# Patient Record
Sex: FEMALE | Race: OTHER | HISPANIC OR LATINO | Employment: OTHER | URBAN - METROPOLITAN AREA
[De-identification: names, ages, dates, MRNs, and addresses within clinical notes are randomized per-mention and may not be internally consistent; named-entity substitution may affect disease eponyms.]

---

## 2018-12-08 ENCOUNTER — APPOINTMENT (EMERGENCY)
Dept: RADIOLOGY | Facility: HOSPITAL | Age: 48
End: 2018-12-08
Payer: MEDICARE

## 2018-12-08 ENCOUNTER — APPOINTMENT (EMERGENCY)
Dept: CT IMAGING | Facility: HOSPITAL | Age: 48
End: 2018-12-08
Payer: MEDICARE

## 2018-12-08 ENCOUNTER — HOSPITAL ENCOUNTER (EMERGENCY)
Facility: HOSPITAL | Age: 48
Discharge: HOME | End: 2018-12-08
Attending: EMERGENCY MEDICINE | Admitting: EMERGENCY MEDICINE
Payer: MEDICARE

## 2018-12-08 VITALS
TEMPERATURE: 97.6 F | OXYGEN SATURATION: 97 % | SYSTOLIC BLOOD PRESSURE: 142 MMHG | RESPIRATION RATE: 16 BRPM | WEIGHT: 137.13 LBS | DIASTOLIC BLOOD PRESSURE: 84 MMHG | HEART RATE: 108 BPM

## 2018-12-08 DIAGNOSIS — E87.2 ACIDOSIS: ICD-10-CM

## 2018-12-08 DIAGNOSIS — N20.0 KIDNEY STONE ON LEFT SIDE: ICD-10-CM

## 2018-12-08 DIAGNOSIS — K52.9 ENTERITIS: Primary | ICD-10-CM

## 2018-12-08 DIAGNOSIS — D21.9 LEIOMYOMA: ICD-10-CM

## 2018-12-08 DIAGNOSIS — D72.825 BANDEMIA: ICD-10-CM

## 2018-12-08 LAB
ALBUMIN SERPL BCP-MCNC: 3.6 G/DL (ref 3.5–5)
ALP SERPL-CCNC: 96 U/L (ref 46–116)
ALT SERPL W P-5'-P-CCNC: 55 U/L (ref 12–78)
ANION GAP SERPL CALCULATED.3IONS-SCNC: 15 MMOL/L (ref 4–13)
ANION GAP SERPL CALCULATED.3IONS-SCNC: 18 MMOL/L (ref 4–13)
ANISOCYTOSIS BLD QL SMEAR: PRESENT
ANISOCYTOSIS BLD QL SMEAR: PRESENT
AST SERPL W P-5'-P-CCNC: 102 U/L (ref 5–45)
BACTERIA UR QL AUTO: ABNORMAL /HPF
BASOPHILS # BLD MANUAL: 0 THOUSAND/UL (ref 0–0.1)
BASOPHILS # BLD MANUAL: 0 THOUSAND/UL (ref 0–0.1)
BASOPHILS NFR MAR MANUAL: 0 % (ref 0–1)
BASOPHILS NFR MAR MANUAL: 0 % (ref 0–1)
BILIRUB SERPL-MCNC: 0.52 MG/DL (ref 0.2–1)
BILIRUB UR QL STRIP: ABNORMAL
BUN SERPL-MCNC: 14 MG/DL (ref 5–25)
BUN SERPL-MCNC: 9 MG/DL (ref 5–25)
CALCIUM SERPL-MCNC: 8 MG/DL (ref 8.3–10.1)
CALCIUM SERPL-MCNC: 9.7 MG/DL (ref 8.3–10.1)
CHLORIDE SERPL-SCNC: 100 MMOL/L (ref 100–108)
CHLORIDE SERPL-SCNC: 105 MMOL/L (ref 100–108)
CLARITY UR: CLEAR
CO2 SERPL-SCNC: 19 MMOL/L (ref 21–32)
CO2 SERPL-SCNC: 20 MMOL/L (ref 21–32)
COLOR UR: YELLOW
COLOR, POC: YELLOW
CREAT SERPL-MCNC: 0.81 MG/DL (ref 0.6–1.3)
CREAT SERPL-MCNC: 0.84 MG/DL (ref 0.6–1.3)
EOSINOPHIL # BLD MANUAL: 0.13 THOUSAND/UL (ref 0–0.4)
EOSINOPHIL # BLD MANUAL: 0.19 THOUSAND/UL (ref 0–0.4)
EOSINOPHIL NFR BLD MANUAL: 1 % (ref 0–6)
EOSINOPHIL NFR BLD MANUAL: 1 % (ref 0–6)
ERYTHROCYTE [DISTWIDTH] IN BLOOD BY AUTOMATED COUNT: 15.3 % (ref 11.6–15.1)
ERYTHROCYTE [DISTWIDTH] IN BLOOD BY AUTOMATED COUNT: 15.5 % (ref 11.6–15.1)
EXT PREG TEST URINE: NEGATIVE
GFR SERPL CREATININE-BSD FRML MDRD: 84 ML/MIN/1.73SQ M
GFR SERPL CREATININE-BSD FRML MDRD: 88 ML/MIN/1.73SQ M
GLUCOSE SERPL-MCNC: 124 MG/DL (ref 65–140)
GLUCOSE SERPL-MCNC: 146 MG/DL (ref 65–140)
GLUCOSE UR STRIP-MCNC: NEGATIVE MG/DL
HCT VFR BLD AUTO: 34.4 % (ref 34.8–46.1)
HCT VFR BLD AUTO: 41.6 % (ref 34.8–46.1)
HGB BLD-MCNC: 10.6 G/DL (ref 11.5–15.4)
HGB BLD-MCNC: 13.1 G/DL (ref 11.5–15.4)
HGB UR QL STRIP.AUTO: ABNORMAL
KETONES UR STRIP-MCNC: ABNORMAL MG/DL
LEUKOCYTE ESTERASE UR QL STRIP: NEGATIVE
LG PLATELETS BLD QL SMEAR: PRESENT
LIPASE SERPL-CCNC: 257 U/L (ref 73–393)
LYMPHOCYTES # BLD AUTO: 0.39 THOUSAND/UL (ref 0.6–4.47)
LYMPHOCYTES # BLD AUTO: 0.39 THOUSAND/UL (ref 0.6–4.47)
LYMPHOCYTES # BLD AUTO: 2 % (ref 14–44)
LYMPHOCYTES # BLD AUTO: 3 % (ref 14–44)
MCH RBC QN AUTO: 27 PG (ref 26.8–34.3)
MCH RBC QN AUTO: 27.1 PG (ref 26.8–34.3)
MCHC RBC AUTO-ENTMCNC: 30.8 G/DL (ref 31.4–37.4)
MCHC RBC AUTO-ENTMCNC: 31.5 G/DL (ref 31.4–37.4)
MCV RBC AUTO: 86 FL (ref 82–98)
MCV RBC AUTO: 88 FL (ref 82–98)
MONOCYTES # BLD AUTO: 0.19 THOUSAND/UL (ref 0–1.22)
MONOCYTES # BLD AUTO: 0.39 THOUSAND/UL (ref 0–1.22)
MONOCYTES NFR BLD: 1 % (ref 4–12)
MONOCYTES NFR BLD: 3 % (ref 4–12)
NEUTROPHILS # BLD MANUAL: 11.99 THOUSAND/UL (ref 1.85–7.62)
NEUTROPHILS # BLD MANUAL: 18.52 THOUSAND/UL (ref 1.85–7.62)
NEUTS BAND NFR BLD MANUAL: 10 % (ref 0–8)
NEUTS BAND NFR BLD MANUAL: 5 % (ref 0–8)
NEUTS SEG NFR BLD AUTO: 86 % (ref 43–75)
NEUTS SEG NFR BLD AUTO: 88 % (ref 43–75)
NITRITE UR QL STRIP: NEGATIVE
NON-SQ EPI CELLS URNS QL MICRO: ABNORMAL /HPF
NRBC BLD AUTO-RTO: 0 /100 WBCS
NRBC BLD AUTO-RTO: 0 /100 WBCS
PH UR STRIP.AUTO: 6 [PH] (ref 4.5–8)
PLATELET # BLD AUTO: 275 THOUSANDS/UL (ref 149–390)
PLATELET # BLD AUTO: 396 THOUSANDS/UL (ref 149–390)
PLATELET BLD QL SMEAR: ADEQUATE
PLATELET BLD QL SMEAR: ADEQUATE
PMV BLD AUTO: 11.2 FL (ref 8.9–12.7)
PMV BLD AUTO: 11.6 FL (ref 8.9–12.7)
POTASSIUM SERPL-SCNC: 3.8 MMOL/L (ref 3.5–5.3)
POTASSIUM SERPL-SCNC: 3.9 MMOL/L (ref 3.5–5.3)
PROT SERPL-MCNC: 8.6 G/DL (ref 6.4–8.2)
PROT UR STRIP-MCNC: >=300 MG/DL
RBC # BLD AUTO: 3.92 MILLION/UL (ref 3.81–5.12)
RBC # BLD AUTO: 4.83 MILLION/UL (ref 3.81–5.12)
RBC #/AREA URNS AUTO: ABNORMAL /HPF
SODIUM SERPL-SCNC: 137 MMOL/L (ref 136–145)
SODIUM SERPL-SCNC: 140 MMOL/L (ref 136–145)
SP GR UR STRIP.AUTO: >=1.03 (ref 1–1.03)
TOTAL CELLS COUNTED SPEC: 100
TOTAL CELLS COUNTED SPEC: 100
UROBILINOGEN UR QL STRIP.AUTO: 0.2 E.U./DL
WBC # BLD AUTO: 12.89 THOUSAND/UL (ref 4.31–10.16)
WBC # BLD AUTO: 19.29 THOUSAND/UL (ref 4.31–10.16)
WBC #/AREA URNS AUTO: ABNORMAL /HPF

## 2018-12-08 PROCEDURE — 71046 X-RAY EXAM CHEST 2 VIEWS: CPT

## 2018-12-08 PROCEDURE — 83690 ASSAY OF LIPASE: CPT | Performed by: PHYSICIAN ASSISTANT

## 2018-12-08 PROCEDURE — 80053 COMPREHEN METABOLIC PANEL: CPT | Performed by: PHYSICIAN ASSISTANT

## 2018-12-08 PROCEDURE — 96361 HYDRATE IV INFUSION ADD-ON: CPT

## 2018-12-08 PROCEDURE — 85007 BL SMEAR W/DIFF WBC COUNT: CPT | Performed by: PHYSICIAN ASSISTANT

## 2018-12-08 PROCEDURE — 85027 COMPLETE CBC AUTOMATED: CPT | Performed by: PHYSICIAN ASSISTANT

## 2018-12-08 PROCEDURE — 96374 THER/PROPH/DIAG INJ IV PUSH: CPT

## 2018-12-08 PROCEDURE — 99284 EMERGENCY DEPT VISIT MOD MDM: CPT

## 2018-12-08 PROCEDURE — 36415 COLL VENOUS BLD VENIPUNCTURE: CPT | Performed by: PHYSICIAN ASSISTANT

## 2018-12-08 PROCEDURE — 81025 URINE PREGNANCY TEST: CPT | Performed by: PHYSICIAN ASSISTANT

## 2018-12-08 PROCEDURE — 96375 TX/PRO/DX INJ NEW DRUG ADDON: CPT

## 2018-12-08 PROCEDURE — 80048 BASIC METABOLIC PNL TOTAL CA: CPT | Performed by: PHYSICIAN ASSISTANT

## 2018-12-08 PROCEDURE — 74177 CT ABD & PELVIS W/CONTRAST: CPT

## 2018-12-08 PROCEDURE — 81001 URINALYSIS AUTO W/SCOPE: CPT

## 2018-12-08 RX ORDER — LISINOPRIL 5 MG/1
5 TABLET ORAL DAILY
COMMUNITY

## 2018-12-08 RX ORDER — LORAZEPAM 2 MG/ML
1 INJECTION INTRAMUSCULAR ONCE
Status: COMPLETED | OUTPATIENT
Start: 2018-12-08 | End: 2018-12-08

## 2018-12-08 RX ORDER — ONDANSETRON 2 MG/ML
4 INJECTION INTRAMUSCULAR; INTRAVENOUS ONCE
Status: COMPLETED | OUTPATIENT
Start: 2018-12-08 | End: 2018-12-08

## 2018-12-08 RX ORDER — ONDANSETRON 4 MG/1
4 TABLET, ORALLY DISINTEGRATING ORAL EVERY 8 HOURS PRN
Qty: 12 TABLET | Refills: 0 | Status: SHIPPED | OUTPATIENT
Start: 2018-12-08

## 2018-12-08 RX ORDER — DICYCLOMINE HCL 20 MG
20 TABLET ORAL 2 TIMES DAILY
Qty: 20 TABLET | Refills: 0 | Status: SHIPPED | OUTPATIENT
Start: 2018-12-08

## 2018-12-08 RX ADMIN — ONDANSETRON 4 MG: 2 INJECTION INTRAMUSCULAR; INTRAVENOUS at 07:54

## 2018-12-08 RX ADMIN — IOHEXOL 100 ML: 350 INJECTION, SOLUTION INTRAVENOUS at 09:04

## 2018-12-08 RX ADMIN — SODIUM CHLORIDE 1000 ML: 0.9 INJECTION, SOLUTION INTRAVENOUS at 09:47

## 2018-12-08 RX ADMIN — SODIUM CHLORIDE 1000 ML: 0.9 INJECTION, SOLUTION INTRAVENOUS at 10:52

## 2018-12-08 RX ADMIN — LORAZEPAM 1 MG: 2 INJECTION INTRAMUSCULAR; INTRAVENOUS at 07:54

## 2018-12-08 RX ADMIN — SODIUM CHLORIDE 1000 ML: 0.9 INJECTION, SOLUTION INTRAVENOUS at 07:55

## 2018-12-08 NOTE — ED NOTES
Pt states she is feeling much better  Explained to patient we will wait about one half hour more and then redraw labs       Oh Galindo RN  12/08/18 9730

## 2018-12-08 NOTE — ED NOTES
Pt's fluids moved to pole to elevate them and get them in faster       Kalee Hess, MARIE  12/08/18 9382

## 2018-12-08 NOTE — ED PROVIDER NOTES
History  Chief Complaint   Patient presents with    Abdominal Pain     mid abdominal pain with vomiting and diarrhea that started last night  no sick contacts  pt anxious upon arrival      47y  o female with PMH of fibromylagia, HTN, depression and panic attacks presents to the ER for nausea, vomiting, diarrhea and epigastric abdominal pain since last night  Patient denies taking any medication for symptoms  She describes her abdominal pain as "something stuck"  She denies radiation of pain  She rates her pain 6/10 and states it comes and goes  She denies sick contacts  She recently traveled here from [facility] on the [facility]th  She is here visiting and is returning to [place] next week  She denies fever, chills, chest pain, dyspnea, urinary symptoms, weakness or paresthesias  History provided by:  Patient   used: No        Prior to Admission Medications   Prescriptions Last Dose Informant Patient Reported? Taking? LORAZEPAM PO   Yes Yes   Sig: Take by mouth as needed   lisinopril (ZESTRIL) 5 mg tablet   Yes Yes   Sig: Take 5 mg by mouth daily      Facility-Administered Medications: None       Past Medical History:   Diagnosis Date    Fibromyalgia     Hypertension     emotional htn per patient   Psychiatric disorder     depression, panic attacks       Past Surgical History:   Procedure Laterality Date    BREAST SURGERY      CHOLECYSTECTOMY      HYSTERECTOMY      KIDNEY STONE SURGERY         History reviewed  No pertinent family history  I have reviewed and agree with the history as documented  Social History   Substance Use Topics    Smoking status: Never Smoker    Smokeless tobacco: Never Used    Alcohol use No        Review of Systems   Constitutional: Negative for chills and fever  Eyes: Negative for redness  Respiratory: Negative for shortness of breath  Cardiovascular: Negative for chest pain     Gastrointestinal: Positive for abdominal pain, diarrhea, nausea and vomiting  Genitourinary: Negative for dysuria, frequency, hematuria and urgency  Musculoskeletal: Negative for neck stiffness  Skin: Negative for rash  Allergic/Immunologic: Negative for food allergies  Neurological: Negative for weakness and numbness  Physical Exam  Physical Exam   Constitutional:  Non-toxic appearance  No distress  HENT:   Head: Normocephalic and atraumatic  Right Ear: Tympanic membrane, external ear and ear canal normal  No drainage, swelling or tenderness  No foreign bodies  Tympanic membrane is not erythematous  No hemotympanum  Left Ear: Tympanic membrane, external ear and ear canal normal  No drainage, swelling or tenderness  No foreign bodies  Tympanic membrane is not erythematous  No hemotympanum  Nose: Nose normal    Mouth/Throat: Uvula is midline, oropharynx is clear and moist and mucous membranes are normal  No uvula swelling  No posterior oropharyngeal edema, posterior oropharyngeal erythema or tonsillar abscesses  No tonsillar exudate  Neck: Normal range of motion and phonation normal  Neck supple  No tracheal deviation present  Cardiovascular: Normal rate, regular rhythm, S1 normal, S2 normal and normal heart sounds  Exam reveals no gallop and no friction rub  No murmur heard  Pulmonary/Chest: Effort normal and breath sounds normal  No respiratory distress  She has no decreased breath sounds  She has no wheezes  She has no rhonchi  She has no rales  She exhibits no tenderness  Abdominal: Soft  Bowel sounds are normal  She exhibits no distension  There is tenderness in the epigastric area  There is no rebound, no guarding and no CVA tenderness  Neurological: She is alert  GCS eye subscore is 4  GCS verbal subscore is 5  GCS motor subscore is 6  Skin: Skin is warm and dry  No rash noted  Psychiatric: She has a normal mood and affect  Nursing note and vitals reviewed        Vital Signs  ED Triage Vitals [12/08/18 0730]   Temperature Pulse Respirations Blood Pressure SpO2   97 6 °F (36 4 °C) (!) 135 18 (!) 201/112 98 %      Temp Source Heart Rate Source Patient Position - Orthostatic VS BP Location FiO2 (%)   Oral Monitor Sitting Right arm --      Pain Score       7           Vitals:    12/08/18 0800 12/08/18 0913 12/08/18 1051 12/08/18 1203   BP:  128/72 146/90 142/84   Pulse: (!) 110 97 103 (!) 108   Patient Position - Orthostatic VS:   Lying        Visual Acuity      ED Medications  Medications   sodium chloride 0 9 % bolus 1,000 mL (0 mL Intravenous Stopped 12/8/18 0947)   ondansetron (ZOFRAN) injection 4 mg (4 mg Intravenous Given 12/8/18 0754)   LORazepam (ATIVAN) 2 mg/mL injection 1 mg (1 mg Intravenous Given 12/8/18 0754)   iohexol (OMNIPAQUE) 350 MG/ML injection (SINGLE-DOSE) 100 mL (100 mL Intravenous Given 12/8/18 0904)   sodium chloride 0 9 % bolus 1,000 mL (0 mL Intravenous Stopped 12/8/18 1346)   sodium chloride 0 9 % bolus 1,000 mL (0 mL Intravenous Stopped 12/8/18 1047)       Diagnostic Studies  Results Reviewed     Procedure Component Value Units Date/Time    CBC and differential [[id number]]  (Abnormal) Collected:  12/08/18 1233    Lab Status:  Final result Specimen:  Blood from Arm, Right Updated:  12/08/18 1326     WBC 12 89 (H) Thousand/uL      RBC 3 92 Million/uL      Hemoglobin 10 6 (L) g/dL      Hematocrit 34 4 (L) %      MCV 88 fL      MCH 27 0 pg      MCHC 30 8 (L) g/dL      RDW 15 5 (H) %      MPV 11 6 fL      Platelets 331 Thousands/uL      nRBC 0 /100 WBCs     Basic metabolic panel [[id number]]  (Abnormal) Collected:  12/08/18 1233    Lab Status:  Final result Specimen:  Blood from Arm, Right Updated:  12/08/18 1308     Sodium 140 mmol/L      Potassium 3 8 mmol/L      Chloride 105 mmol/L      CO2 20 (L) mmol/L      ANION GAP 15 (H) mmol/L      BUN 9 mg/dL      Creatinine 0 81 mg/dL      Glucose 124 mg/dL      Calcium 8 0 (L) mg/dL      eGFR 88 ml/min/1 73sq m     Narrative:         National Kidney Disease Education Program recommendations are as follows:  GFR calculation is accurate only with a steady state creatinine  Chronic Kidney disease less than 60 ml/min/1 73 sq  meters  Kidney failure less than 15 ml/min/1 73 sq  meters  Urine Microscopic [[id number]]  (Abnormal) Collected:  12/08/18 0821    Lab Status:  Final result Specimen:  Urine from Urine, Clean Catch Updated:  12/08/18 0854     RBC, UA None Seen /hpf      WBC, UA 1-2 (A) /hpf      Epithelial Cells Occasional /hpf      Bacteria, UA None Seen /hpf     CBC and differential [[id number]]  (Abnormal) Collected:  12/08/18 0755    Lab Status:  Final result Specimen:  Blood from Arm, Right Updated:  12/08/18 0838     WBC 19 29 (H) Thousand/uL      RBC 4 83 Million/uL      Hemoglobin 13 1 g/dL      Hematocrit 41 6 %      MCV 86 fL      MCH 27 1 pg      MCHC 31 5 g/dL      RDW 15 3 (H) %      MPV 11 2 fL      Platelets 050 (H) Thousands/uL      nRBC 0 /100 WBCs     Narrative: This is an appended report  These results have been appended to a previously verified report  Comprehensive metabolic panel [[id number]]  (Abnormal) Collected:  12/08/18 0755    Lab Status:  Final result Specimen:  Blood from Arm, Right Updated:  12/08/18 0816     Sodium 137 mmol/L      Potassium 3 9 mmol/L      Chloride 100 mmol/L      CO2 19 (L) mmol/L      ANION GAP 18 (H) mmol/L      BUN 14 mg/dL      Creatinine 0 84 mg/dL      Glucose 146 (H) mg/dL      Calcium 9 7 mg/dL       (H) U/L      ALT 55 U/L      Alkaline Phosphatase 96 U/L      Total Protein 8 6 (H) g/dL      Albumin 3 6 g/dL      Total Bilirubin 0 52 mg/dL      eGFR 84 ml/min/1 73sq m     Narrative:         National Kidney Disease Education Program recommendations are as follows:  GFR calculation is accurate only with a steady state creatinine  Chronic Kidney disease less than 60 ml/min/1 73 sq  meters  Kidney failure less than 15 ml/min/1 73 sq  meters      Lipase [[id number]]  (Normal) Collected:  12/08/18 0755    Lab Status: Final result Specimen:  Blood from Arm, Right Updated:  12/08/18 0816     Lipase 257 u/L     POCT urinalysis dipstick [[id number]]  (Abnormal) Resulted:  12/08/18 0808    Lab Status:  Final result Specimen:  Urine Updated:  12/08/18 0808     Color, UA yellow    POCT pregnancy, urine [[id number]]  (Normal) Resulted:  12/08/18 0808    Lab Status:  Final result Updated:  12/08/18 0808     EXT PREG TEST UR (Ref: Negative) negative    ED Urine Macroscopic [[id number]]  (Abnormal) Collected:  12/08/18 0821    Lab Status:  Final result Specimen:  Urine Updated:  12/08/18 0806     Color, UA Yellow     Clarity, UA Clear     pH, UA 6 0     Leukocytes, UA Negative     Nitrite, UA Negative     Protein, UA >=300 (A) mg/dl      Glucose, UA Negative mg/dl      Ketones, UA 15 (1+) (A) mg/dl      Urobilinogen, UA 0 2 E U /dl      Bilirubin, UA Interference- unable to analyze (A)     Blood, UA Small (A)     Specific Gravity, UA >=1 030    Narrative:       CLINITEK RESULT                 XR chest 2 views   ED Interpretation by Jody Trujillo PA-C (12/08 1023)   No acute cardiopulmonary findings seen by me at this time  Final Result by Leena August MD (12/08 1014)      No acute cardiopulmonary disease  Workstation performed: [id number]         CT abdomen pelvis with contrast   Final Result by Jose Gutierrez MD (12/08 0913)      Fluid-filled loops of large and small bowel suggesting an enteritis  Uterine leiomyoma  Nonobstructing left renal calculus  Workstation performed: [id number]                    Procedures  Procedures       Phone Contacts  ED Phone Contact    ED Course  ED Course as of Dec 08 1354   Sat Dec 08, 2018   7273 Will give 3 liters of fluid total  Will then recheck CBC and BMP  Patient denies URI symptoms, chest pain or dyspnea  Patient states she has been experiencing cough  Will check CXR  Informed of plan  Patient agreeable                                  MDM  Number of Diagnoses or Management Options  Acidosis: new and requires workup  Bandemia: new and requires workup  Enteritis: new and requires workup  Kidney stone on left side: new and requires workup  Leiomyoma: new and requires workup  Diagnosis management comments: DDX consists of but not limited to: abdominal pathology, pancreatitis, panic attack, gastroenteritis    Will check CBC, CMP, lipase, CT abdomen, urine and pregnancy  Will give fluids, ativan and zofran  Patient states her pressure is normally elevated when she is having a panic attack  Pressure rechecked and looking better than first pressure  Patient states she normally takes 7 5mg of Ativan per dose when anxious  Will give 1mg of Ativan to start and reassess  Patient states this feels like her panic attacks but she never usually has vomiting and diarrhea with it      7939 Will give 3 liters of fluid total  Will then recheck CBC for improving or resolved bandemia and BMP for improving or resolved anion gap  Patient denies URI symptoms, chest pain or dyspnea  Patient states she has been experiencing cough  Will check CXR  Informed of plan  Patient agreeable  Bandemia resolved  Anion gap is lower  Patient reporting complete resolution of symptoms  Informed patient of lab and imaging findings  Will discharge  At discharge, I instructed the patient to:  -follow up with pcp  -take Zofran as prescribed for nausea and vomiting  -take Bentyl as prescribed for abdominal cramping  -rest and drink plenty of fluids  -follow a bland diet  -return to the ER if symptoms worsened or new symptoms arose  Patient agreed to this plan and was stable at time of discharge           Amount and/or Complexity of Data Reviewed  Clinical lab tests: ordered and reviewed  Tests in the radiology section of CPT®: ordered and reviewed    Patient Progress  Patient progress: stable    CritCare Time    Disposition  Final diagnoses:   Enteritis   Leiomyoma   Kidney stone on left side   Acidosis   Bandemia Time reflects when diagnosis was documented in both MDM as applicable and the Disposition within this note     Time User Action Codes Description Comment    12/8/2018  1:31 PM Nicklas Mary Kate Add [K52 9] Enteritis     12/8/2018  1:31 PM Nicklas Mary Kate Add [D21 9] Leiomyoma     12/8/2018  1:32 PM Nicklas Mary Kate Add [N20 0] Kidney stone on left side     12/8/2018  1:32 PM Nicklas Mary Kate Add [E87 2] Acidosis     12/8/2018  1:32 PM Cristian Graver A Add [D72 825] Bandemia       ED Disposition     ED Disposition Condition Comment    Discharge  [address] discharge to home/self care  Condition at discharge: Stable        Follow-up Information     Follow up With Specialties Details Why Contact Info Additional [address] Medicine Schedule an appointment as soon as possible for a visit As needed [address] [place] [address]  [phone] [address], [address]          Discharge Medication List as of 12/8/2018  1:35 PM      START taking these medications    Details   dicyclomine (BENTYL) 20 mg tablet Take 1 tablet (20 mg total) by mouth 2 (two) times a day, Starting Sat 12/8/2018, Print      ondansetron (ZOFRAN-ODT) 4 mg disintegrating tablet Take 1 tablet (4 mg total) by mouth every 8 (eight) hours as needed for nausea or vomiting, Starting Sat 12/8/2018, Print         CONTINUE these medications which have NOT CHANGED    Details   lisinopril (ZESTRIL) 5 mg tablet Take 5 mg by mouth daily, Historical Med      LORAZEPAM PO Take by mouth as needed, Historical Med           No discharge procedures on file      ED Provider  Electronically Signed by           Ishan Freeman PA-C  12/08/18 1248

## 2018-12-08 NOTE — DISCHARGE INSTRUCTIONS
Enteritis   LO QUE NECESITA SABER:   La enteritis es la inflamación del intestino jones  Podría ser provocada por comer alimentos o richmond líquidos contaminados con un virus, bacteria o parásitos  También podría ser a causa de ciertos medicamentos, por daño proveniente de la radiación y por condiciones médicas mick la enfermedad de [place]  INSTRUCCIONES SOBRE EL [facility]ERSON HOSPITALARIA:   Regrese a la demetrius de emergencias si:   · Usted no puede dejar de vomitar  · Usted no ha orinado en 12 horas  Pregúntele a yang Darius Fanti vitaminas y minerales son adecuados para usted  · Usted tiene fiebre más herson de 101 5  · Usted tiene cely o mucosidad en isela movimientos intestinales  · Usted continúa vomitando o tiene diarrea por más de 3 días, incluso después del tratamiento  · Usted tiene la boca y ojos resecos, está orinando menos de lo normal y se siente mareado cuando se pone de pie  · Yang boca u ojos están secos  Usted no está orinando tanto o con la misma frecuencia  · Usted pierde peso sin proponérselo  · Usted tiene preguntas o inquietudes acerca de yang condición o cuidado  Medicamentos:   · Medicamentos,  Podrían administrarle medicamentospara combatir drake infección provocada por drake bacteria o un parásito  Es posible que también necesite medicamento para reducir o detener la diarrea y los vómitos  No tome estos medicamentos a menos que yang médico se lo autorice  Podrían ser necesarios otros medicamentos para tratar las condiciones médicas que están provocando la enteritis  · [place] isela medicamentos mick se le haya indicado  Consulte con yang médico si usted gustabo que yang medicamento no le está ayudando o si presenta efectos secundarios  Infórmele si es alérgico a algún medicamento  Mantenga drake lista actualizada de los [employer], las vitaminas y los productos herbales que genie  Incluya los siguientes datos de los medicamentos: cantidad, frecuencia y motivo de administración   [place] con usted la lista o los envases de la píldoras a isela citas de seguimiento  Lleve la lista de los medicamentos con usted en michael de drake emergencia  Controle la enteritis:   · Consuma alimentos que le ayuden a disminuir isela síntomas  Limite o evite los alimentos y líquidos que son altos en azúcar, grasa y Guillermina Corinne para ayudar a aliviar la diarrea  Podría ayudar que evite la lactosa  La lactosa es un tipo de azúcar que se encuentra en los productos lácteos  Es posible que usted [employer] sopas, caldos, verduras lauren cocidas, frutas enlatadas y susy horneadas o asadas  Pregunte a yang dietista o médico si usted debería seguir drake dieta especial  Es posible que necesite evitar otros alimentos si tiene ciertas condiciones médicas mick la enfermedad celíaca  · [address] se le haya indicado  Pregunte cuánto líquido debe richmond cada día y cuáles líquidos son los más adecuados para usted  Es importante evitar o tratar la deshidratación  Incluso si usted ha estado vomitando, chupe hielo triturado o tome tragos pequeños de líquidos kwame con frecuencia  Poco a poco, aumente la cantidad de líquidos kwame que usted tome  Si se deshidrata, es probable que necesite líquidos por vía intravenosa  · [place] drake solución de rehidratación oral (SRO) mick se le indique  Esta solución contiene agua, sales y azúcares necesarios para reemplazar los líquidos corporales perdidos  Pregunte qué tipo de solución de rehidratación oral debe usar, qué cantidad debe richmond y dónde puede obtenerla  Evite la enteritis:  La enteritis que es provocada por drake bacteria, parásitos o virus puede evitarse  Lo siguiente podría ayudar a evitar eva tipo de enteritis:  · Lávese las ehsan frecuentemente  Utilice agua [address]  [employer] las ehsan después de usar el baño, cambiarle el pañal a un lynsey o estornudar  Lávese las ehsan antes de comer o preparar alimentos  · Limpie las superficies y lave la ropa con frecuencia    Janelle Tyler ropa y isela toallas por separado del amalia de la ropa  Limpie las superficies de sandoval hogar con limpiador antibacterial o con blanqueador  · Lave y cocine lauren los alimentos  Lave las verduras crudas antes de cocinar  [address] y [employer]  No utilice los mismos platos para las susy crudas que para otros alimentos  Ponga en el refrigerador inmediatamente cualquier alimento que haya sobrado  · Esté alerta cuando usted vaya de campamento o cuando viaje  Solamente tome agua limpia  No tome agua de los lindsey o vandana a menos que usted purifique o hierva el agua ricci  Cuando viaje, tome agua embotellada y no le ponga hielo  No coma fruta con la cáscara  No coma pescado crudo o susy que no están cocinadas completamente  Acuda a isela consultas de control con sandoval médico según le indicaron  Anote isela preguntas para que se acuerde de hacerlas juan isela visitas  © 2017 [facility] Information is for End User's use only and may not be sold, redistributed or otherwise used for commercial purposes  All illustrations and images included in CareNotes® are the copyrighted property of A D A M , Inc  or Dalton Small  Esta información es sólo para uso en educación  Sandoval intención no es darle un consejo médico sobre enfermedades o tratamientos  Colsulte con sandoval Halley Bjornstad farmacéutico antes de seguir cualquier régimen médico para saber si es seguro y efectivo para usted  Cálculos renales   LO QUE NECESITA SABER:   Los cálculos renales se selena en el sistema urinario cuando el agua y los residuos de la orina no están lauren balanceados  Cuando esto sucede, ciertos tipos de rebecca de desecho se separan de la orina  Los rebecca se acumulan y selena piedras en los riñones  Es posible que ted tenga 1 o más cálculos en los riñones     INSTRUCCIONES SOBRE EL [facility]ERSON HOSPITALARIA:   Regrese a la demetrius de emergencias si:   · Usted tiene vómitos que no se alivian con medicación  Pregúntele a yang Terrilyn Holgate vitaminas y minerales son adecuados para usted  · Usted tiene fiebre  · Usted tiene dificultad para orinar  · Usted orina con cely  · Usted tiene dolor intenso  · Tiene alguna pregunta o inquietud acerca de yang condición o cuidado  Medicamentos:   · AINEs (Analgésicos antiinflamatorios no esteroides) mick el ibuprofeno, ayudan a disminuir la inflamación, el dolor y la fiebre  Gl[place] medicamento esta disponible con o sin drake receta médica  Los AINEs pueden causar sangrado estomacal o problemas renales en ciertas personas  Si usted genie un medicamento anticoagulante, siempre pregúntele a yang médico si los NOAH son seguros para usted  Siempre cole la etiqueta de glendy medicamento y [place] instrucciones  · Podrían recetarle un medicamento  podrían ser Elfrieda Carl  Pregunte cómo richmond estos medicamentos de drake forma vasquez  · Medicamentos,  para balancear el nivel de los electrolitos  · [place] isela medicamentos mick se le haya indicado  Consulte con yang médico si usted gustabo que yang medicamento no le está ayudando o si presenta efectos secundarios  Infórmele si es alérgico a cualquier medicamento  Mantenga drake lista actualizada de los [employer], las vitaminas y los productos herbales que genie  Incluya los siguientes datos de los medicamentos: cantidad, frecuencia y motivo de administración  Traiga con usted la lista o los envases de la píldoras a isela citas de seguimiento  Lleve la lista de los medicamentos con usted en michael de drake emergencia  Acuda a isela consultas de control con yang médico según le indicaron  Es posible que necesite regresar para que le realicen más exámenes  Anote isela preguntas para que se acuerde de hacerlas juan isela visitas  Cuidados personales:   · [place] suficiente líquidos  Es probable que yagn médico le indique que tome al menos 8 a 12 vasos (de ocho onzas) de líquidos al día   [place] ayuda a deshacerse de los cálculos renales cuando usted orina  El agua es el mejor líquido para richmond  · Cuele la orina cada vez que use el baño  Orine por medio de un colador o un pedazo de felipe jones para así recolectar los cálculos  Lleve los cálculos donde sandoval médico para que pueda enviarlos al laboratorio y realizarles exámenes  [place] ayudará a sandoval médico a planear el mejor tratamiento para usted  · Consuma alimentos saludables y variados  Los alimentos sanos incluyen frutas, vegetales, panes integrales, productos lácteos bajos en grasas, frijoles y pescado  Es probable que usted tenga que limitar la cantidad de sodio (sal) o proteínas que usted come  Pida más información sobre los mejores alimentos para usted  · Manténgase activo  Isabela cálculos pueden pasar con más facilidad si usted permanece activo  Pregunte sobre cuáles son las mejores actividades para usted  Después de eliminar los cálculos renales:  Mabel vez que deseche los cálculos renales, sandoval médico podría  ordenar un examen de orina de 24 horas  Los [employer] del examen de orina de 24 horas ayudarán a sandoval médico a planear las formas de evitar la formación de más cálculos  Si tiene que [employer] prueba de [facility] de 24 horas, sandoval médico le dará más instrucciones  © 2017 [address] [facility] Information is for End User's use only and may not be sold, redistributed or otherwise used for commercial purposes  All illustrations and images included in CareNotes® are the copyrighted property of A D A M , Inc  or Dalton Small  Esta información es sólo para uso en educación  Sandoval intención no es darle un consejo médico sobre enfermedades o tratamientos  Colsulte con sandoval Halley Bjornstad farmacéutico antes de seguir cualquier régimen médico para saber si es seguro y efectivo para usted  DISCHARGE INSTRUCTIONS:    FOLLOW UP WITH YOUR PRIMARY CARE PROVIDER OR THE [address] [place] [address]  MAKE AN APPOINTMENT TO BE SEEN      TAKE ZOFRAN AS PRESCRIBED FOR NAUSEA AND VOMITING  IF RASH, SHORTNESS OF BREATH OR TROUBLE SWALLOWING, STOP TAKING THE MEDICATION AND BE SEEN  TAKE BENTYL AS PRESCRIBED  IF RASH, SHORTNESS OF BREATH OR TROUBLE SWALLOWING, STOP TAKING THE MEDICATION AND BE SEEN  REST AND DRINK PLENTY OF FLUIDS  FOLLOW A BLAND DIET  IF SYMPTOMS WORSEN OR NEW SYMPTOMS ARISE, RETURN TO THE ER TO BE SEEN